# Patient Record
Sex: MALE | Race: BLACK OR AFRICAN AMERICAN | NOT HISPANIC OR LATINO | Employment: FULL TIME | URBAN - METROPOLITAN AREA
[De-identification: names, ages, dates, MRNs, and addresses within clinical notes are randomized per-mention and may not be internally consistent; named-entity substitution may affect disease eponyms.]

---

## 2018-01-24 ENCOUNTER — OFFICE VISIT (OUTPATIENT)
Dept: URGENT CARE | Facility: CLINIC | Age: 28
End: 2018-01-24
Payer: COMMERCIAL

## 2018-01-24 VITALS
RESPIRATION RATE: 18 BRPM | SYSTOLIC BLOOD PRESSURE: 116 MMHG | DIASTOLIC BLOOD PRESSURE: 72 MMHG | OXYGEN SATURATION: 98 % | HEIGHT: 63 IN | HEART RATE: 95 BPM | TEMPERATURE: 101 F | WEIGHT: 155 LBS | BODY MASS INDEX: 27.46 KG/M2

## 2018-01-24 DIAGNOSIS — R11.2 NAUSEA AND VOMITING, INTRACTABILITY OF VOMITING NOT SPECIFIED, UNSPECIFIED VOMITING TYPE: ICD-10-CM

## 2018-01-24 DIAGNOSIS — R68.89 FLU-LIKE SYMPTOMS: Primary | ICD-10-CM

## 2018-01-24 DIAGNOSIS — R05.9 COUGH: ICD-10-CM

## 2018-01-24 LAB
CTP QC/QA: YES
CTP QC/QA: YES
FLUAV AG NPH QL: NEGATIVE
FLUBV AG NPH QL: NEGATIVE
S PYO RRNA THROAT QL PROBE: NEGATIVE

## 2018-01-24 PROCEDURE — 87804 INFLUENZA ASSAY W/OPTIC: CPT | Mod: 59,QW,S$GLB, | Performed by: EMERGENCY MEDICINE

## 2018-01-24 PROCEDURE — 99204 OFFICE O/P NEW MOD 45 MIN: CPT | Mod: S$GLB,,, | Performed by: EMERGENCY MEDICINE

## 2018-01-24 PROCEDURE — 87880 STREP A ASSAY W/OPTIC: CPT | Mod: QW,S$GLB,, | Performed by: EMERGENCY MEDICINE

## 2018-01-24 RX ORDER — OSELTAMIVIR PHOSPHATE 75 MG/1
75 CAPSULE ORAL 2 TIMES DAILY
Qty: 10 CAPSULE | Refills: 0 | Status: SHIPPED | OUTPATIENT
Start: 2018-01-24 | End: 2018-01-29

## 2018-01-24 RX ORDER — ONDANSETRON 8 MG/1
8 TABLET, ORALLY DISINTEGRATING ORAL EVERY 6 HOURS PRN
Qty: 12 TABLET | Refills: 0 | Status: SHIPPED | OUTPATIENT
Start: 2018-01-24 | End: 2018-01-27

## 2018-01-24 RX ORDER — CODEINE PHOSPHATE AND GUAIFENESIN 10; 100 MG/5ML; MG/5ML
5-10 SOLUTION ORAL 3 TIMES DAILY PRN
Qty: 240 ML | Refills: 0 | Status: SHIPPED | OUTPATIENT
Start: 2018-01-24 | End: 2018-01-29

## 2018-01-24 NOTE — LETTER
January 24, 2018      [facility]  [address], [address]  [place] [address]  Phone: [phone]  Fax: [phone]       Patient: Soren Driscoll   YOB: 1990  Date of Visit: 01/24/2018    To Whom It May Concern:    Stef Driscoll  was at [facility] on 01/24/2018. He may return to work/school on 1/29/18, earlier if feels better and no fever for 24 hours with no restrictions. If you have any questions or concerns, or if I can be of further assistance, please do not hesitate to contact me.    Sincerely,            Arturo Marino MD

## 2018-01-25 NOTE — PROGRESS NOTES
"Subjective:       Patient ID: Soren Driscoll is a 27 y.o. male.    Vitals:  height is 5' 3" (1.6 m) and weight is 70.3 kg (155 lb). His oral temperature is 101.4 °F (38.6 °C) (abnormal). His blood pressure is 116/72 and his pulse is 95. His respiration is 18 and oxygen saturation is 98%.     Chief Complaint: Sinus Problem (body aches)    PT states cough 2 d ago, and yesterday with fever/chills/aches/n/v/diarrhea, been around people at work who are sick.      Sinus Problem   This is a new problem. The current episode started yesterday. The problem has been gradually worsening since onset. The maximum temperature recorded prior to his arrival was 101 - 101.9 F. The fever has been present for less than 1 day. His pain is at a severity of 8/10. The pain is severe. Associated symptoms include congestion, coughing, headaches, sinus pressure and a sore throat. Pertinent negatives include no chills, ear pain, hoarse voice or shortness of breath. (Body aches) Past treatments include oral decongestants. The treatment provided no relief.     Review of Systems   Constitution: Negative for chills, fever and malaise/fatigue.   HENT: Positive for congestion, sinus pressure and sore throat. Negative for ear pain and hoarse voice.    Eyes: Negative for discharge and redness.   Cardiovascular: Negative for chest pain, dyspnea on exertion and leg swelling.   Respiratory: Positive for cough and sputum production. Negative for shortness of breath and wheezing.    Musculoskeletal: Negative for myalgias.   Gastrointestinal: Negative for abdominal pain and nausea.   Neurological: Positive for headaches.       Objective:      Physical Exam   Constitutional: He is oriented to person, place, and time.   Occas nonprod cough   HENT:   Head: Normocephalic and atraumatic.   Right Ear: Tympanic membrane, external ear and ear canal normal.   Left Ear: Tympanic membrane, external ear and ear canal normal.   Nose: Mucosal edema present. No " rhinorrhea. Right sinus exhibits no maxillary sinus tenderness and no frontal sinus tenderness. Left sinus exhibits no maxillary sinus tenderness and no frontal sinus tenderness.   Mouth/Throat: Uvula is midline and mucous membranes are normal. Posterior oropharyngeal erythema present. No oropharyngeal exudate.   Eyes: EOM are normal. Pupils are equal, round, and reactive to light.   Neck: Normal range of motion. Neck supple.   Cardiovascular: Normal rate, regular rhythm and normal heart sounds.    Pulmonary/Chest: Breath sounds normal.   Abdominal: Soft. There is no tenderness. There is no guarding.   Musculoskeletal: Normal range of motion.   Lymphadenopathy:     He has no cervical adenopathy.   Neurological: He is alert and oriented to person, place, and time.   Skin: Skin is warm and dry.   Psychiatric: He has a normal mood and affect. His behavior is normal.       Assessment:       1. Flu-like symptoms    2. Nausea and vomiting, intractability of vomiting not specified, unspecified vomiting type    3. Cough        Plan:         Flu-like symptoms  -     POCT Influenza A/B  -     POCT rapid strep A  -     ondansetron (ZOFRAN-ODT) 8 MG TbDL; Take 1 tablet (8 mg total) by mouth every 6 (six) hours as needed (Prn NAUSEA).  Dispense: 12 tablet; Refill: 0  -     oseltamivir (TAMIFLU) 75 MG capsule; Take 1 capsule (75 mg total) by mouth 2 (two) times daily.  Dispense: 10 capsule; Refill: 0    Nausea and vomiting, intractability of vomiting not specified, unspecified vomiting type  -     ondansetron (ZOFRAN-ODT) 8 MG TbDL; Take 1 tablet (8 mg total) by mouth every 6 (six) hours as needed (Prn NAUSEA).  Dispense: 12 tablet; Refill: 0    Cough  -     guaifenesin-codeine 100-10 mg/5 ml (TUSSI-ORGANIDIN NR)  mg/5 mL syrup; Take 5-10 mLs by mouth 3 (three) times daily as needed for Cough.  Dispense: 240 mL; Refill: 0      Arturo Marino MD  Go to the Emergency Department for any problems  Call your PCP for follow up  next available.

## 2018-01-25 NOTE — PATIENT INSTRUCTIONS
Arturo Marino MD  Go to the Emergency Department for any problems  Call your PCP for follow up next available.    Diet for Vomiting or Diarrhea (Adult)    Your symptoms may return or get worse after eating certain foods listed below. If this happens, stop eating these foods until your symptoms ease and you feel better.  Once the vomiting stops, follow the steps below.   During the first 12 to 24 hours  During the first 12 to 24 hours, follow this diet:  · Drinks. Plain water, sport drinks like electrolyte solutions, soft drinks without caffeine, mineral water (plain or flavored), clear fruit juices, and decaffeinated tea and coffee.  · Soups. Clear broth.  · Desserts. Plain gelatin, popsicles, and fruit juice bars. As you feel better, you may add 6 to 8 ounces of yogurt per day. If you have diarrhea, don't have foods or drinks that contain sugar, high-fructose corn syrup, or sugar alcohols.  During the next 24 hours  During the next 24 hours you may add the following to the above:  · Hot cereal, plain toast, bread, rolls, and crackers  · Plain noodles, rice, mashed potatoes, and chicken noodle or rice soup  · Unsweetened canned fruit (but not pineapple) and bananas  Don't eat more than 15 grams of fat a day. Do this by staying away from margarine, butter, oils, mayonnaise, sauces, gravies, fried foods, peanut butter, meat, poultry, and fish.  Don't eat much fiber. Stay away from raw or cooked vegetables, fresh fruits (except bananas), and bran cereals.  Limit how much caffeine and chocolate you have. Do not use any spices or seasonings except salt.  During the next 24 hours  Slowly go back to your normal diet, as you feel better and your symptoms ease.  Date Last Reviewed: 8/1/2016  © 2756-7837 [employer]. [address] [place] [address], [place] [address]. All rights reserved. This information is not intended as a substitute for professional medical care. Always follow your healthcare professional's  instructions.        Influenza (Adult)    Influenza is also called the flu. It is a viral illness that affects the air passages of your lungs. It is different from the common cold. The flu can easily be passed from one to person to another. It may be spread through the air by coughing and sneezing. Or it can be spread by touching the sick person and then touching your own eyes, nose, or mouth.  The flu starts 1 to 3 days after you are exposed to the flu virus. It may last for 1 to 2 weeks but many people feel tired or fatigued for many weeks afterward. You usually dont need to take antibiotics unless you have a complication. This might be an ear or sinus infection or pneumonia.  Symptoms of the flu may be mild or severe. They can include extreme tiredness (wanting to stay in bed all day), chills, fevers, muscle aches, soreness with eye movement, headache, and a dry, hacking cough.  Home care  Follow these guidelines when caring for yourself at home:  · Avoid being around cigarette smoke, whether yours or other peoples.  · Acetaminophen or ibuprofen will help ease your fever, muscle aches, and headache. Dont give aspirin to anyone younger than 18 who has the flu. Aspirin can harm the liver.  · Nausea and loss of appetite are common with the flu. Eat light meals. Drink 6 to 8 glasses of liquids every day. Good choices are water, sport drinks, soft drinks without caffeine, juices, tea, and soup. Extra fluids will also help loosen secretions in your nose and lungs.  · Over-the-counter cold medicines will not make the flu go away faster. But the medicines may help with coughing, sore throat, and congestion in your nose and sinuses. Dont use a decongestant if you have high blood pressure.  · Stay home until your fever has been gone for at least 24 hours without using medicine to reduce fever.  Follow-up care  Follow up with your healthcare provider, or as advised, if you are not getting better over the next week.  If  "you are age 65 or older, talk with your provider about getting a pneumococcal vaccine every 5 years. You should also get this vaccine if you have chronic asthma or COPD. All adults should get a flu vaccine every fall. Ask your provider about this.  When to seek medical advice  Call your healthcare provider right away if any of these occur:  · Cough with lots of colored mucus (sputum) or blood in your mucus  · Chest pain, shortness of breath, wheezing, or trouble breathing  · Severe headache, or face, neck, or ear pain  · New rash with fever  · Fever of 100.4°F (38°C) or higher, or as directed by your healthcare provider  · Confusion, behavior change, or seizure  · Severe weakness or dizziness  · You get a new fever or cough after getting better for a few days  Date Last Reviewed: 1/1/2017  © 1839-8588 [employer]. [address] [place] [address]. All rights reserved. This information is not intended as a substitute for professional medical care. Always follow your healthcare professional's instructions.        Viral Syndrome (Adult)  A viral illness may cause a number of symptoms. The symptoms depend on the part of the body that the virus affects. If it settles in your nose, throat, and lungs, it may cause cough, sore throat, congestion, and sometimes headache. If it settles in your stomach and intestinal tract, it may cause vomiting and diarrhea. Sometimes it causes vague symptoms like "aching all over," feeling tired, loss of appetite, or fever.  A viral illness usually lasts 1 to 2 weeks, but sometimes it lasts longer. In some cases, a more serious infection can look like a viral syndrome in the first few days of the illness. You may need another exam and additional tests to know the difference. Watch for the warning signs listed below.  Home care  Follow these guidelines for taking care of yourself at home:  · If symptoms are severe, rest at home for the first 2 to 3 days.  · Stay away " from cigarette smoke - both your smoke and the smoke from others.  · You may use over-the-counter acetaminophen or ibuprofen for fever, muscle aching, and headache, unless another medicine was prescribed for this. If you have chronic liver or kidney disease or ever had a stomach ulcer or GI bleeding, talk with your doctor before using these medicines. No one who is younger than 18 and ill with a fever should take aspirin. It may cause severe disease or death.  · Your appetite may be poor, so a light diet is fine. Avoid dehydration by drinking 8 to 12 8-ounce glasses of fluids each day. This may include water; orange juice; lemonade; apple, grape, and cranberry juice; clear fruit drinks; electrolyte replacement and sports drinks; and decaffeinated teas and coffee. If you have been diagnosed with a kidney disease, ask your doctor how much and what types of fluids you should drink to prevent dehydration. If you have kidney disease, drinking too much fluid can cause it build up in the your body and be dangerous to your health.  · Over-the-counter remedies won't shorten the length of the illness but may be helpful for cough, sore throat; and nasal and sinus congestion. Don't use decongestants if you have high blood pressure.  Follow-up care  Follow up with your healthcare provider if you do not improve over the next week.  Call 911  Get emergency medical care if any of the following occur:  · Convulsion  · Feeling weak, dizzy, or like you are going to faint  · Chest pain, shortness of breath, wheezing, or difficulty breathing  When to seek medical advice  Call your healthcare provider right away if any of these occur:  · Cough with lots of colored sputum (mucus) or blood in your sputum  · Chest pain, shortness of breath, wheezing, or difficulty breathing  · Severe headache; face, neck, or ear pain  · Severe, constant pain in the lower right side of your belly (abdominal)  · Continued vomiting (cant keep liquids  down)  · Frequent diarrhea (more than 5 times a day); blood (red or black color) or mucus in diarrhea  · Feeling weak, dizzy, or like you are going to faint  · Extreme thirst  · Fever of 100.4°F (38°C) or higher, or as directed by your healthcare provider  Date Last Reviewed: 9/25/2015  © 3432-7206 [employer]. [address] [place] [address]. All rights reserved. This information is not intended as a substitute for professional medical care. Always follow your healthcare professional's instructions.

## 2018-01-27 ENCOUNTER — TELEPHONE (OUTPATIENT)
Dept: URGENT CARE | Facility: CLINIC | Age: 28
End: 2018-01-27

## 2021-12-30 ENCOUNTER — LAB VISIT (OUTPATIENT)
Dept: PRIMARY CARE CLINIC | Facility: OTHER | Age: 31
End: 2021-12-30
Payer: OTHER GOVERNMENT

## 2021-12-30 DIAGNOSIS — Z20.822 ENCOUNTER FOR LABORATORY TESTING FOR COVID-19 VIRUS: ICD-10-CM

## 2021-12-30 PROCEDURE — U0003 INFECTIOUS AGENT DETECTION BY NUCLEIC ACID (DNA OR RNA); SEVERE ACUTE RESPIRATORY SYNDROME CORONAVIRUS 2 (SARS-COV-2) (CORONAVIRUS DISEASE [COVID-19]), AMPLIFIED PROBE TECHNIQUE, MAKING USE OF HIGH THROUGHPUT TECHNOLOGIES AS DESCRIBED BY CMS-2020-01-R: HCPCS | Performed by: INTERNAL MEDICINE

## 2022-01-03 LAB
SARS-COV-2 RNA RESP QL NAA+PROBE: DETECTED
SARS-COV-2- CYCLE NUMBER: 19

## 2024-06-20 ENCOUNTER — TELEPHONE (OUTPATIENT)
Dept: DERMATOLOGY | Facility: CLINIC | Age: 34
End: 2024-06-20

## 2024-06-20 ENCOUNTER — PATIENT MESSAGE (OUTPATIENT)
Dept: DERMATOLOGY | Facility: CLINIC | Age: 34
End: 2024-06-20

## 2024-06-20 NOTE — TELEPHONE ENCOUNTER
Spoke with pt on phone regarding setting up [facility]. Pt was unsure of his insurance. Told pt to send picture of insurance card in portal so we can verify if we take this insurance or not.     ----- Message from Uche Driscoll sent at 6/20/2024 12:21 PM CDT -----  Regarding: Appt  Contact: [phone]  Pt calling to speak with someone in provider office regards scheduling an appt for warts. No appt available.  Please call pt back at  [phone]

## 2024-08-24 ENCOUNTER — HOSPITAL ENCOUNTER (EMERGENCY)
Facility: HOSPITAL | Age: 34
Discharge: HOME | End: 2024-08-24
Attending: EMERGENCY MEDICINE
Payer: COMMERCIAL

## 2024-08-24 VITALS
RESPIRATION RATE: 18 BRPM | HEIGHT: 63 IN | DIASTOLIC BLOOD PRESSURE: 97 MMHG | WEIGHT: 185 LBS | TEMPERATURE: 98 F | HEART RATE: 115 BPM | BODY MASS INDEX: 32.78 KG/M2 | OXYGEN SATURATION: 96 % | SYSTOLIC BLOOD PRESSURE: 162 MMHG

## 2024-08-24 DIAGNOSIS — J02.9 SORE THROAT: Primary | ICD-10-CM

## 2024-08-24 DIAGNOSIS — K13.79 UVULAR SWELLING: ICD-10-CM

## 2024-08-24 LAB
GROUP A STREP, MOLECULAR: NEGATIVE
INFLUENZA A, MOLECULAR: NEGATIVE
INFLUENZA B, MOLECULAR: NEGATIVE
SARS-COV-2 RDRP RESP QL NAA+PROBE: NEGATIVE
SPECIMEN SOURCE: NORMAL

## 2024-08-24 PROCEDURE — 96372 THER/PROPH/DIAG INJ SC/IM: CPT

## 2024-08-24 PROCEDURE — 87651 STREP A DNA AMP PROBE: CPT | Mod: ER

## 2024-08-24 PROCEDURE — U0002 COVID-19 LAB TEST NON-CDC: HCPCS | Mod: ER

## 2024-08-24 PROCEDURE — 63600175 PHARM REV CODE 636 W HCPCS: Mod: ER

## 2024-08-24 PROCEDURE — 99284 EMERGENCY DEPT VISIT MOD MDM: CPT | Mod: 25,ER

## 2024-08-24 PROCEDURE — 87502 INFLUENZA DNA AMP PROBE: CPT | Mod: ER

## 2024-08-24 RX ORDER — PREDNISONE 20 MG/1
40 TABLET ORAL DAILY
Qty: 8 TABLET | Refills: 0 | Status: SHIPPED | OUTPATIENT
Start: 2024-08-24 | End: 2024-08-28

## 2024-08-24 RX ORDER — DEXAMETHASONE SODIUM PHOSPHATE 4 MG/ML
8 INJECTION, SOLUTION INTRA-ARTICULAR; INTRALESIONAL; INTRAMUSCULAR; INTRAVENOUS; SOFT TISSUE
Status: COMPLETED | OUTPATIENT
Start: 2024-08-24 | End: 2024-08-24

## 2024-08-24 RX ADMIN — DEXAMETHASONE SODIUM PHOSPHATE 8 MG: 4 INJECTION, SOLUTION INTRA-ARTICULAR; INTRALESIONAL; INTRAMUSCULAR; INTRAVENOUS; SOFT TISSUE at 05:08

## 2024-08-24 NOTE — Clinical Note
"Soren Lang"Americo was seen and treated in our emergency department on 8/24/2024.  He may return to work on 08/27/2024.       If you have any questions or concerns, please don't hesitate to call.      Amanda Goldberg PA-C"

## 2024-08-24 NOTE — ED PROVIDER NOTES
Encounter Date: 8/24/2024       History     Chief Complaint   Patient presents with    Sore Throat     Sore throat X2 days. Denies nasal congestion and fever. Reports vomiting last night after drinking alcohol.      Soren rDiscoll is a 34 y.o. male  has no past medical history on file. presenting to the Emergency Department for sore throat x2 days.  No fever or chills.  No upper respiratory symptoms.  No cough.  No shortness a breath or chest pain.  No difficulty breathing.  No trismus or difficulty managing oral secretions, drooling.  Reports nausea and vomiting last night secondary to alcohol consumption.  Patient is not currently nauseous or vomiting.  Denies new food or liquid exposures.  No new medications.        The history is provided by the patient.     Review of patient's allergies indicates:  No Known Allergies  History reviewed. No pertinent past medical history.  Past Surgical History:   Procedure Laterality Date    HERNIA REPAIR       Family History   Problem Relation Name Age of Onset    Hypertension Mother      Diabetes Mother      No Known Problems Father       Social History     Tobacco Use    Smoking status: Never    Smokeless tobacco: Never   Substance Use Topics    Alcohol use: No     Review of Systems   Constitutional:  Negative for fever.   HENT:  Positive for sore throat.    Respiratory:  Negative for shortness of breath.    Cardiovascular:  Negative for chest pain.   Gastrointestinal:  Positive for vomiting. Negative for nausea.   Genitourinary:  Negative for dysuria.   Musculoskeletal:  Negative for back pain.   Skin:  Negative for rash.   Neurological:  Negative for weakness.   Hematological:  Does not bruise/bleed easily.   All other systems reviewed and are negative.      Physical Exam     Initial Vitals [08/24/24 1712]   BP Pulse Resp Temp SpO2   (!) 162/97 (!) 115 18 98.4 °F (36.9 °C) 96 %      MAP       --         Physical Exam    Nursing note and vitals reviewed.  Constitutional:  Vital signs are normal. He appears well-developed and well-nourished. He is not diaphoretic. He is active.  Non-toxic appearance. No distress.   HENT:   Head: Normocephalic and atraumatic.   Right Ear: Hearing, tympanic membrane, external ear and ear canal normal.   Left Ear: Hearing, tympanic membrane, external ear and ear canal normal.   Nose: Nose normal. Right sinus exhibits no maxillary sinus tenderness and no frontal sinus tenderness. Left sinus exhibits no maxillary sinus tenderness and no frontal sinus tenderness.   Mouth/Throat: Uvula is midline and mucous membranes are normal. No trismus in the jaw. Uvula swelling present. Posterior oropharyngeal erythema present. No oropharyngeal exudate or posterior oropharyngeal edema.   Eyes: Conjunctivae, EOM and lids are normal. Pupils are equal, round, and reactive to light.   Neck: Phonation normal. Neck supple. No Brudzinski's sign and no Kernig's sign noted.   Normal range of motion.  Cardiovascular:  Normal rate, regular rhythm, normal heart sounds and normal pulses.           Pulmonary/Chest: No respiratory distress.   Abdominal: He exhibits no distension.   Musculoskeletal:         General: Normal range of motion.      Cervical back: Normal range of motion and neck supple. No edema, erythema or rigidity. Normal range of motion.     Lymphadenopathy:     He has no cervical adenopathy.   Neurological: He is alert and oriented to person, place, and time. He is not disoriented. GCS score is 15. GCS eye subscore is 4. GCS verbal subscore is 5. GCS motor subscore is 6.   Skin: Skin is dry.   Psychiatric: He has a normal mood and affect. His behavior is normal. Judgment and thought content normal.         ED Course   Procedures  Labs Reviewed   INFLUENZA A & B BY MOLECULAR       Result Value    Influenza A, Molecular Negative      Influenza B, Molecular Negative      Flu A & B Source Nasal swab     GROUP A STREP, MOLECULAR    Group A Strep, Molecular Negative      SARS-COV-2 RNA AMPLIFICATION, QUAL    SARS-CoV-2 RNA, Amplification, Qual Negative            Imaging Results    None          Medications   dexAMETHasone injection 8 mg (8 mg Intramuscular Given 8/24/24 1750)     Medical Decision Making  This is an evaluation of a 34 y.o. male that presents to the Emergency Department for sore throat. The patient is a non-toxic, afebrile, and well appearing male. On physical exam, the tonsils are symmetrical with erythema and without exudates. The uvula is midline with no drooling, hoarseness, trismus, facial swelling, meningeal signs; no findings to suggest peritonsillar or retropharyngeal abscess, epiglottitis, or airway compromise. There is no cervical lymphadenopathy. TM's without infection. Breath sounds clear and equal to auscultation bilaterally. Mucus membranes are moist, and the patient appears well hydrated. Vital Signs Are Reassuring.     Given the above findings, my overall impression is sore throat with uvular swelling. Differential diagnosis: Including but not limited to Eugene's angina, epiglottitis, foreign body aspiration, retropharyngeal abscess, peritonsillar abscess, esophageal perforation, mediastinitis, esophagitis, sialolithiasis, parotitis, laryngitis, tracheitis, dental/periapical abscess, TMJ disorder, pneumonia, Streptococcal/bacterial pharyngitis, viral pharyngitis.    DC Meds as noted. Tylenol/Ibuprofen PRN, sore throat self-care advised (salt water gargles, throat lozenges, and hot tea w/ honey). DC instructions indicated on AVS.      There does not appear to be any indication for further emergent testing, observation, or hospitalization at this time. A mutual shared decision making discussion was had with the patient. Patient appears stable for and is comfortable with discharge home. The diagnosis, treatment plan, instructions for follow-up as well as ED return precautions were discussed. Advised to follow-up with PCP for outpatient follow-up in 2-3  days. Signs and symptoms that would warrant immediate return to ED were reviewed prior to discharge. All questions and concerns were asked, answered, and addressed. Patient expressed understanding and agreement with the plan.     This case was discussed with my attending, Dr. Alfaro who is in agreement with my assessment and plan.       Amount and/or Complexity of Data Reviewed  Labs:  Decision-making details documented in ED Course.    Risk  Prescription drug management.               ED Course as of 08/24/24 1820   Sat Aug 24, 2024   1754 Influenza A, Molecular: Negative []   1755 Influenza B, Molecular: Negative []   1755 SARS-CoV-2 RNA, Amplification, Qual: Negative []   1755 Group A Strep, Molecular: Negative []      ED Course User Index  [] Amanda Goldberg PA-C                           Clinical Impression:  Final diagnoses:  [J02.9] Sore throat (Primary)  [K13.79] Uvular swelling          ED Disposition Condition    Discharge Stable          ED Prescriptions       Medication Sig Dispense Start Date End Date Auth. Provider    predniSONE (DELTASONE) 20 MG tablet Take 2 tablets (40 mg total) by mouth once daily. for 4 days 8 tablet 8/24/2024 8/28/2024 Amanda Goldberg PA-C          Follow-up Information    None          Amanda Goldberg PA-C  08/24/24 1820

## 2024-08-28 ENCOUNTER — OFFICE VISIT (OUTPATIENT)
Dept: OTOLARYNGOLOGY | Facility: CLINIC | Age: 34
End: 2024-08-28
Payer: COMMERCIAL

## 2024-08-28 VITALS
SYSTOLIC BLOOD PRESSURE: 143 MMHG | HEART RATE: 84 BPM | WEIGHT: 196.88 LBS | BODY MASS INDEX: 34.87 KG/M2 | DIASTOLIC BLOOD PRESSURE: 103 MMHG

## 2024-08-28 DIAGNOSIS — K13.79 UVULAR SWELLING: ICD-10-CM

## 2024-08-28 DIAGNOSIS — J02.9 PHARYNGITIS, UNSPECIFIED ETIOLOGY: ICD-10-CM

## 2024-08-28 PROCEDURE — 99999 PR PBB SHADOW E&M-EST. PATIENT-LVL III: CPT | Mod: PBBFAC,,, | Performed by: OTOLARYNGOLOGY

## 2024-08-28 PROCEDURE — 99204 OFFICE O/P NEW MOD 45 MIN: CPT | Mod: S$GLB,,, | Performed by: OTOLARYNGOLOGY

## 2024-08-28 RX ORDER — FAMOTIDINE 40 MG/1
40 TABLET, FILM COATED ORAL NIGHTLY
Qty: 14 TABLET | Refills: 5 | Status: SHIPPED | OUTPATIENT
Start: 2024-08-28 | End: 2025-02-24

## 2024-08-28 NOTE — PROGRESS NOTES
"  Chief Complaint   Patient presents with    Sore Throat     Sore throat and also sometimes feels like something is stuck in throat patient stated that this has been since Saturday         HPI:      The pt is 34 y.o. male with a sore throat that began a few days ago.  The patient had feeling of acute throat swelling and something "stuck in the throat", that was making him gag and vomit.  He presented to the ED and was noted to have a swollen uvula.  He has never had throat swelling symptoms before.  Sore throat and upper respiratory symptoms preceded the uvula swelling.  He was worked up for viral etiologies which were negative.  He is not on any other medications and has never taken ACE inhibitors.  He does not have any previous history of throat swelling, allergic reaction, angioedema.    He was placed on prednisone and discharged from the hospital.  He does report symptoms are overall improved, but he still feels the uvula is somewhat elongated and irritating the back of his throat.                No past medical history on file.  Social History     Socioeconomic History    Marital status: Single   Tobacco Use    Smoking status: Never    Smokeless tobacco: Never   Substance and Sexual Activity    Alcohol use: No    Sexual activity: Yes     Past Surgical History:   Procedure Laterality Date    HERNIA REPAIR       Family History   Problem Relation Name Age of Onset    Hypertension Mother      Diabetes Mother      No Known Problems Father             Review of Systems  General: negative for chills, fever or weight loss  Psychological: negative for mood changes or depression  Ophthalmic: negative for blurry vision, photophobia or eye pain  ENT: see HPI  Respiratory: no cough, shortness of breath, or wheezing  Cardiovascular: no chest pain or dyspnea on exertion  Gastrointestinal: no abdominal pain, change in bowel habits, or black/ bloody stools  Musculoskeletal: negative for gait disturbance or muscular " weakness  Neurological: no syncope or seizures; no ataxia  Dermatological: negative for pruritis,  rash and jaundice  Hematologic/lymphatic: no easy bruising, no new adenopathy      Physical Exam:    Vitals:    08/28/24 1059   BP: (!) 143/103   Pulse: 84         Constitutional:   He is oriented to person, place, and time. Vital signs are normal. He appears well-developed and well-nourished. He appears alert. He is cooperative.  Non-toxic appearance. Normal speech.      Head:  Normocephalic and atraumatic. Salivary glands normal.  Facial strength is normal.      Ears:  Hearing normal to normal and whispered voice; external ear normal without scars, lesions, or masses; ear canal, tympanic membrane, and middle ear normal., right ear hearing normal to normal and whispered voice; external ear normal without scars, lesions, or masses; ear canal, tympanic membrane, and middle ear normal. and left ear hearing normal to normal and whispered voice; external ear normal without scars, lesions, or masses; ear canal, tympanic membrane, and middle ear normal..   Right Ear: Tympanic membrane is not perforated, not erythematous and not retracted. No middle ear effusion.   Left Ear: Tympanic membrane is not perforated, not erythematous and not retracted.  No middle ear effusion.     Nose:  Mucosal edema present. No rhinorrhea, septal deviation, nasal septal hematoma or polyps. No epistaxis. No turbinate masses and no turbinate hypertrophy (2+ size).  Right sinus exhibits no maxillary sinus tenderness and no frontal sinus tenderness. Left sinus exhibits no maxillary sinus tenderness and no frontal sinus tenderness.     Mouth/Throat  Oropharynx clear and moist without lesions or asymmetry, normal uvula midline, lips, teeth, and gums normal and oropharynx normal. Normal dentition. No uvula swelling (uvula no longer swollen, but elongated and touching posterior tongue), oral lesions, trismus, mucous membrane lesions or xerostomia. No  oropharyngeal exudate, posterior oropharyngeal edema or posterior oropharyngeal erythema. Tonsils present, +2.  Mirror exam not performed due to patient tolerance.  Mirror exam not performed due to patient tolerance.    Mallampati class 2/3.        Neck:  Neck normal without thyromegaly masses, asymmetry, normal tracheal structure, crepitus, and tenderness, thyroid normal, trachea normal, full range of motion with neck supple and no adenopathy. No JVD present. Carotid bruit is not present. Thyroid tenderness is present. No edema and no erythema present. No thyroid mass and no thyromegaly present.     He has no cervical adenopathy.     Cardiovascular:    Normal rate, regular rhythm, normal heart sounds and rate and rhythm, heart sounds, and pulses normal.              Pulmonary/Chest:   Effort and breath sounds normal.     Psychiatric:   He has a normal mood and affect. His speech is normal and behavior is normal.     Neurological:   He is alert and oriented to person, place, and time. He has neurological normal, alert and oriented. No cranial nerve deficit.     Skin:   No abrasions, lacerations, lesions, or rashes.               Assessment:    ICD-10-CM ICD-9-CM    1. Uvular swelling  K13.79 528.9       2. Pharyngitis, unspecified etiology  J02.9 462         Diagnoses of Uvular swelling and Pharyngitis, unspecified etiology were pertinent to this visit.      Plan:    Finish prednisone.  Add Pepcid daily due to stomach irritation noted by patient from prednisone.    Follow-up in a few weeks to recheck, and if uvula still elongated and causing dysphagia/gagging, we may discuss options for further treatment.    Crystal Silva MD

## 2024-08-28 NOTE — PATIENT INSTRUCTIONS
Continue prednisone.  Complete all pills.     Add pepcid daily for the next 2 weeks.      Follow up in 4-6 weeks and we will discuss the elongated uvula if there are still ongoing issues at that time.

## 2025-04-03 ENCOUNTER — HOSPITAL ENCOUNTER (EMERGENCY)
Facility: HOSPITAL | Age: 35
Discharge: HOME | End: 2025-04-03
Attending: EMERGENCY MEDICINE
Payer: COMMERCIAL

## 2025-04-03 VITALS
SYSTOLIC BLOOD PRESSURE: 137 MMHG | OXYGEN SATURATION: 96 % | DIASTOLIC BLOOD PRESSURE: 105 MMHG | HEIGHT: 63 IN | TEMPERATURE: 103 F | BODY MASS INDEX: 32.78 KG/M2 | RESPIRATION RATE: 18 BRPM | HEART RATE: 105 BPM | WEIGHT: 185 LBS

## 2025-04-03 DIAGNOSIS — Z20.828 EXPOSURE TO INFLUENZA: Primary | ICD-10-CM

## 2025-04-03 PROCEDURE — 25000003 PHARM REV CODE 250: Mod: ER

## 2025-04-03 PROCEDURE — 99283 EMERGENCY DEPT VISIT LOW MDM: CPT | Mod: ER

## 2025-04-03 RX ORDER — ACETAMINOPHEN 500 MG
1000 TABLET ORAL
Status: COMPLETED | OUTPATIENT
Start: 2025-04-03 | End: 2025-04-03

## 2025-04-03 RX ADMIN — ACETAMINOPHEN 1000 MG: 500 TABLET ORAL at 07:04

## 2025-04-03 NOTE — Clinical Note
"Soren Larsenkarol Driscoll was seen and treated in our emergency department on 4/3/2025.  He may return to work on 04/07/2025.  Soren Driscoll has the flu. Please excuse from work.      If you have any questions or concerns, please don't hesitate to call.      Manda Lomas PA-C"

## 2025-04-04 NOTE — DISCHARGE INSTRUCTIONS
Thank you for letting me care for you today - it was nice to meet you and I hope you feel better soon. Please follow up with your primary care provider.    Rotate between 500-1000mg (acetaminophen) Tylenol and 400-600mg ibuprofen (Motrin), switching every 3 hours. For example, acetaminophen at 8am, ibuprofen at 11am, then tylenol at 2pm.  Do not take more than 3000 mg of Tylenol in 1 day or more than 2400 mg of ibuprofen and 1 day.     Our goal at [facility] is to always give you outstanding care and exceptional service. You may receive a survey about your experience in our ED. We would greatly appreciate you completing and returning the survey. Your feedback provides us with a way to recognize our staff who give very good care and it helps us learn how to improve when your experience.     All the best,     Manda Lomas, MPH, PA-C  Emergency Department Physician Assistant  [facility], [facility]           Managing Symptoms of Flu and Upper Respiratory Infections (URI) for Adults      You can expect the symptoms of your cold or upper respiratory infection to last 5 to 7 days. A dry hacking cough may continue up to three or four weeks. To help you recover:  Drink more fluids.  Get enough rest.  Use a humidifier or increase time in a steamy shower.      Additional recommendations for managing your symptoms:     Fever, headache, or pain  Acetaminophen (Tylenol)   325mg 2 tablets every 6 hours as needed for the first 5-7 days of infection.   500mg, 2 tablets every 8 hours as needed for the first 5-7 days of infection.  Maximum dose: 3000mg of acetaminophen in 24 hours.  Avoid combination products that contain acetaminophen (read the label) while taking scheduled acetaminophen  Use the lowest effective dose for the shortest possible duration to reduce risk of serious adverse effects.  Avoid acetaminophen if you have liver problems  Ibuprofen (Advil, Motrin) 400 mg every 6 hours-8  hours.  Avoid ibuprofen if you are pregnant, have kidney disease, coronary heart disease, heart failure, or history of a gastric ulcer or gastric surgery  Maximum dose: 2400 mg of ibuprofen in 24 hours.  Use lowest needed dose for the shortest possible time frame to reduce the risk of serious side effects.  Do not use longer than 7 days, unless directed by your health care provider.  Take with food to prevent getting an upset stomach.  You can rotate between Acetaminophen and Ibuprofen every 3-4 hours. For example, Tylenol at 9am, Ibuprofen at noon, Tylenol at 3pm, etc.   Sinus drainage, sinus/nose/ear congestion  Keep in mind that green or yellow secretions do not equal bacterial infection. It is common to have nasal drainage of various colors with a viral cold or upper respiratory infection. These usually get better with time and do not require antibiotics.  Use over the counter antihistamines allergy medications, like Zyrtec or Claritin, according to directions  Saline sinus rinse - Mix and use according to directions on the product (NeilMed©, XClear©).  Nasal spray (Flonase®, Nasacort®) - 2 sprays per nostril once a day after a saline sinus irrigation.  Sudafed (pseudoephedrine) capsules - Take every 4 to 6 hours per package instructions for sinus congestion.   Available behind the pharmacy counter.   Avoid if you have high blood pressure, heart disease, or take beta blockers (atenolol, metoprolol, etc).   Avoid medications with phenylephrine as an ingredient. Phenylephrine is in many cold/flu medications, but it has been proven to be ineffective.   Sore throat  Take acetaminophen and/or ibuprofen as above.   Use throat lozenges with benzocaine, which help numb your sore throat (Cepacol®, chloraseptic brands).  Gargle with saltwater several times a day to help relieve throat pain. Mix 1/4 teaspoon (1.4 grams) of table salt in 8 ounces (237 milliliters) of warm water. Gargle the solution and then spit it out.    Cough  Avoid coughing too hard or too often. Excessive coughing may cause bronchial (tubes going to the lungs) irritation which could cause a cough-irritate-cough cycle that can prolong the cough for weeks.  Honey - 1 to 2 teaspoons every 4 to 6 hours as needed.  Cough drops every 4 to 6 hours as needed.  Guaifenesin/dextromethorphan syrup - (Robitussin DM®) per package instructions. Do not take if you are taking an antidepressant, opioid pain medication, sleeping medication, or antipsychotic medication.

## 2025-04-04 NOTE — ED PROVIDER NOTES
Encounter Date: 4/3/2025       History     Chief Complaint   Patient presents with    Cough     Cough, congestion, aches, and fever since yesterday, daughter has the flu     Patient is a 34 y.o. male who presents with fatigue, chills, generalized body aches, cough, nasal congestion that began yesterday.  Patient's daughter currently has influenza A.  Patient has taken Mucinex for symptom relief, no Tylenol or ibuprofen.  Denies  chest pain, shortness of breath, wheezing, sore throat, stridor, drooling, NVD, abdominal pain, constipation, urinary problems, joint problems, rashes, or any other complaints at this time.      The history is provided by the patient.     Review of patient's allergies indicates:  No Known Allergies  History reviewed. No pertinent past medical history.  Past Surgical History:   Procedure Laterality Date    HERNIA REPAIR       Family History   Problem Relation Name Age of Onset    Hypertension Mother      Diabetes Mother      No Known Problems Father       Social History[1]  Review of Systems   Constitutional:  Positive for chills, fatigue and fever.   HENT:  Positive for congestion, postnasal drip, rhinorrhea and sore throat. Negative for ear discharge, ear pain, facial swelling, trouble swallowing and voice change.    Respiratory:  Positive for cough. Negative for shortness of breath and wheezing.    Cardiovascular:  Negative for chest pain.   Gastrointestinal:  Negative for abdominal distention, abdominal pain, diarrhea, nausea and vomiting.   Genitourinary:  Negative for dysuria, flank pain, frequency and hematuria.   Musculoskeletal:  Positive for myalgias (generalized body aches). Negative for back pain, neck pain and neck stiffness.   Skin:  Negative for rash.   Neurological:  Negative for weakness.   Hematological:  Does not bruise/bleed easily.   All other systems reviewed and are negative.      Physical Exam     Initial Vitals [04/03/25 1840]   BP Pulse Resp Temp SpO2   (!) 137/105  105 18 (!) 102.7 °F (39.3 °C) 96 %      MAP       --         Physical Exam    Vitals reviewed.  Constitutional: He appears well-developed and well-nourished.   HENT:   Head: Normocephalic and atraumatic.   Nose: Rhinorrhea present. Mouth/Throat: Uvula is midline. No uvula swelling. No oropharyngeal exudate, posterior oropharyngeal edema, posterior oropharyngeal erythema or tonsillar abscesses.   Eyes: EOM are normal. Pupils are equal, round, and reactive to light.   Neck:   Normal range of motion.  Cardiovascular:  Normal rate.           Pulmonary/Chest: Breath sounds normal. No respiratory distress. He has no wheezes. He has no rhonchi. He has no rales.   Abdominal: Bowel sounds are normal. He exhibits no distension. There is no abdominal tenderness. There is no rebound.   Musculoskeletal:      Cervical back: Normal range of motion.     Neurological: He is alert and oriented to person, place, and time.         ED Course   Procedures  Labs Reviewed - No data to display       Imaging Results    None          Medications   acetaminophen tablet 1,000 mg (1,000 mg Oral Given 4/3/25 1900)     Medical Decision Making  Patient is well-appearing, febrile 34 y.o. male.  Vitals otherwise do not suggest sepsis. Denies chest pain, SOB, wheezing, difficulty swallowing, neck pain, neck stiffness. Lung sounds are clear and not consistent with pneumonia. There is no neck pain or limited ROM to suggest retropharyngeal abscess or meningitis. Tolerating PO, non-toxic appearing, no hypoxia. The patient remained comfortable and stable during their visit in the ED.  Details of ED course documented in ED workup.     Differential Diagnosis includes, but is not limited to: COVID, influenza, viral URI, bacterial/viral pharyngitis, pneumonia, PTA, sinusitis    All historical, clinical findings reviewed.  Given patient's symptoms of the fact that patient's daughter has influenza, I believe that patient's symptoms are likely secondary to  influenza. There are no concerning features on physical exam to suggest an emergent or life threatening condition or an invasive bacterial infection, including, but not limited to: pneumonia, bacterial otitis media/externa, sinusitis, pharyngitis, or peritonsillar abscess. No further intervention is indicated at this time. The patient is at low risk for an emergent/life threatening medical condition at this time, and I am of the belief that that it is safe to discharge the patient from the emergency department.     Patient instructed to follow up with PCP in 2-3 days for recheck of today's complaints. Patient has been counseled regarding the need for follow-up as well as the indications to return to the emergency room should new or worrisome developments. Discharge and follow-up instructions discussed with the patient who expressed understanding and willingness to comply with recommendations. Patient discharged from the emergency department in stable condition, in no acute distress.     Risk  OTC drugs.                                      Clinical Impression:  Final diagnoses:  [Z20.828] Exposure to influenza (Primary)          ED Disposition Condition    Discharge Stable          ED Prescriptions    None       Follow-up Information    None            [1]   Social History  Tobacco Use    Smoking status: Never    Smokeless tobacco: Never   Substance Use Topics    Alcohol use: No        Manda Lomas PA-C  04/03/25 [id number]

## 2025-04-04 NOTE — ED NOTES
Assumed care of pt who came in reporting gen body aches, cough, fever, chills, and a headache now 9/10 that started yesterday. He reports his daughter had the flu and feels that he now has it. He denies any SOB and is sitting quietly appearing to not be feeling well but in no distress.